# Patient Record
Sex: FEMALE | Race: WHITE
[De-identification: names, ages, dates, MRNs, and addresses within clinical notes are randomized per-mention and may not be internally consistent; named-entity substitution may affect disease eponyms.]

---

## 2018-09-20 ENCOUNTER — HOSPITAL ENCOUNTER (OUTPATIENT)
Dept: HOSPITAL 68 - STS | Age: 58
End: 2018-09-20
Attending: SURGERY
Payer: COMMERCIAL

## 2018-09-20 VITALS — WEIGHT: 152 LBS | HEIGHT: 68 IN | BODY MASS INDEX: 23.04 KG/M2

## 2018-09-20 DIAGNOSIS — Z86.14: ICD-10-CM

## 2018-09-20 DIAGNOSIS — Z85.3: Primary | ICD-10-CM

## 2018-09-20 DIAGNOSIS — Z90.12: ICD-10-CM

## 2018-09-20 DIAGNOSIS — N64.89: ICD-10-CM

## 2018-09-20 NOTE — OPERATIVE REPORT
Operative/Inv Procedure Report
Surgery Date: 09/20/18
Name of Procedure:
Chase flap left breast revision reconstructed left breast would release 
pectoralis muscle placement tissue expander placement Flex HD dermal template.
Pre-Operative Diagnosis:
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 
mastectomy previous latissimus flap and expander.
Post-Operative Diagnosis:
Same
Estimated Blood Loss: 50ml to 100ml
Surgeon/Assistant:
Javier Flaherty MD
 
Anesthesia: laryngeal mask airway
 
Operative/Procedure Note
Note:
Patient was counseled extensively in regards to the procedure the alternatives 
the risks and expected outcomes as relates to request for surgical intervention 
2 reconstructed left breast.  The patient has had failed reconstructions due to 
infection.  She is requesting tissue expander placement with the use of a dermal
template as discussed by me and accepted by her.  We talked about using a 
previous scar as our access point releasing the muscle from scar elevating it as
well as  it from the overlying soft tissue and attaching it to 
AlloDerm direction to a flex HD inferior sling.  We talked about drains being 
placed as well we talked about the risks including loss of the flap ischemia 
open wounds need for explantation pain numbness hematoma seroma infection and 
bleeding.  Once agreed she was marked in the standing position.  She was then 
brought to the operating room after signing informed consent.  She was placed 
supine on the table Venodyne boots were placed laryngeal mask anesthesia was 
established and the chest was prepped and draped in usual sterile fashion.  A 
nipple cover was placed and Ioban dressing was used around the periphery.  
Incision was made above the superior aspect of the previous latissimus skin 
flap.  This was dissected down to a very scarred pectoralis muscle.  The muscle 
was  from the overlying skin of an extensive nature.  Scar was removed 
in bulk where necessary.  The flap correction the pectoralis muscle was elevated
to the clavicle and laterally.  Inferiorly where the previous plaque flap was 
placed was revised using a Chase flap with the use of imbricating sutures of the 
lower scar position of the latissimus flap.  This provided a good inframammary 
fold.  After debulking some of the pedicle from the latissimus due to excess 
subcutaneous tissue the pocket was irrigated multiple times of note with 
antibiotic and Betadine solution.  Local anesthesia was injected in the 
operative site.  2 drains were placed laterally one above and below the 
pectoralis and Flex HD.  The flex HD was then placed sutured to the inferior 
edge of the pectoralis after being completely freed.  A 650 mill tissue expander
was placed in the pocket.  It was closed again helping to define the 
inframammary fold.  320 cc was placed without undue tension in the skin and skin
was closed in multiple layers.  End dictation